# Patient Record
Sex: MALE | Race: WHITE
[De-identification: names, ages, dates, MRNs, and addresses within clinical notes are randomized per-mention and may not be internally consistent; named-entity substitution may affect disease eponyms.]

---

## 2017-08-10 ENCOUNTER — HOSPITAL ENCOUNTER (OUTPATIENT)
Dept: HOSPITAL 76 - ED | Age: 72
Setting detail: OBSERVATION
LOS: 1 days | Discharge: HOME | End: 2017-08-11
Attending: NURSE PRACTITIONER | Admitting: NURSE PRACTITIONER
Payer: OTHER GOVERNMENT

## 2017-08-10 DIAGNOSIS — G45.9: Primary | ICD-10-CM

## 2017-08-10 DIAGNOSIS — R20.0: ICD-10-CM

## 2017-08-10 DIAGNOSIS — I25.2: ICD-10-CM

## 2017-08-10 DIAGNOSIS — I69.398: ICD-10-CM

## 2017-08-10 DIAGNOSIS — Z79.84: ICD-10-CM

## 2017-08-10 DIAGNOSIS — E66.01: ICD-10-CM

## 2017-08-10 DIAGNOSIS — G62.89: ICD-10-CM

## 2017-08-10 DIAGNOSIS — I25.10: ICD-10-CM

## 2017-08-10 DIAGNOSIS — Z87.891: ICD-10-CM

## 2017-08-10 DIAGNOSIS — Z79.82: ICD-10-CM

## 2017-08-10 DIAGNOSIS — E11.9: ICD-10-CM

## 2017-08-10 DIAGNOSIS — Z95.1: ICD-10-CM

## 2017-08-10 DIAGNOSIS — Z95.5: ICD-10-CM

## 2017-08-10 DIAGNOSIS — E78.5: ICD-10-CM

## 2017-08-10 DIAGNOSIS — I10: ICD-10-CM

## 2017-08-10 LAB
ALBUMIN/GLOB SERPL: 1.3 {RATIO} (ref 1–2.2)
ANION GAP SERPL CALCULATED.4IONS-SCNC: 7 MMOL/L (ref 6–13)
BASOPHILS NFR BLD AUTO: 0.1 10^3/UL (ref 0–0.1)
BASOPHILS NFR BLD AUTO: 1.4 %
BILIRUB BLD-MCNC: 0.7 MG/DL (ref 0.2–1)
BUN SERPL-MCNC: 16 MG/DL (ref 6–20)
CALCIUM UR-MCNC: 9.4 MG/DL (ref 8.5–10.3)
CHLORIDE SERPL-SCNC: 103 MMOL/L (ref 101–111)
CO2 SERPL-SCNC: 28 MMOL/L (ref 21–32)
CREAT SERPLBLD-SCNC: 1 MG/DL (ref 0.6–1.2)
CUL URINE ADD CHARGE: (no result)
EOSINOPHIL # BLD AUTO: 0.2 10^3/UL (ref 0–0.7)
EOSINOPHIL NFR BLD AUTO: 2 %
ERYTHROCYTE [DISTWIDTH] IN BLOOD BY AUTOMATED COUNT: 12.9 % (ref 12–15)
EST. AVERAGE GLUCOSE BLD GHB EST-MCNC: 197 MG/DL (ref 70–100)
GFRSERPLBLD MDRD-ARVRAT: 74 ML/MIN/{1.73_M2} (ref 89–?)
GLOBULIN SER-MCNC: 3.2 G/DL (ref 2.1–4.2)
GLUCOSE SERPL-MCNC: 128 MG/DL (ref 70–100)
HBA1C BLD-MCNC: 1.2 G/DL
HCT VFR BLD AUTO: 44.5 % (ref 42–52)
HGB UR QL STRIP: 15.5 G/DL (ref 14–18)
INR PPP: 1 (ref 0.8–1.2)
LIPASE SERPL-CCNC: 17 U/L (ref 22–51)
LYMPHOCYTES # SPEC AUTO: 1.4 10^3/UL (ref 1.5–3.5)
LYMPHOCYTES NFR BLD AUTO: 17.5 %
MCH RBC QN AUTO: 30.8 PG (ref 27–31)
MCHC RBC AUTO-ENTMCNC: 34.9 G/DL (ref 32–36)
MCV RBC AUTO: 88.3 FL (ref 80–94)
MONOCYTES # BLD AUTO: 0.6 10^3/UL (ref 0–1)
MONOCYTES NFR BLD AUTO: 7.7 %
NEUTROPHILS # BLD AUTO: 5.9 10^3/UL (ref 1.5–6.6)
NEUTROPHILS # SNV AUTO: 8.2 X10^3/UL (ref 4.8–10.8)
NEUTROPHILS NFR BLD AUTO: 71.4 %
NRBC # BLD AUTO: 0.1 /100WBC
PDW BLD AUTO: 8.7 FL (ref 7.4–11.4)
PH UR STRIP.AUTO: 6 PH (ref 5–7.5)
POTASSIUM SERPL-SCNC: 4 MMOL/L (ref 3.5–5)
PROT SPEC-MCNC: 7.5 G/DL (ref 6.7–8.2)
PROTHROM ACT/NOR PPP: 11.1 SECS (ref 9.9–12.6)
RBC MAR: 5.04 10^6/UL (ref 4.7–6.1)
SODIUM SERPLBLD-SCNC: 138 MMOL/L (ref 135–145)
SP GR UR STRIP.AUTO: 1.02 (ref 1–1.03)
UA CHARGE (STRIP ONLY): (no result)
UA W/ MICROSCOPIC CHARGE: YES
UR CULTURE IF IND: (no result)
UROBILINOGEN UR STRIP.AUTO-MCNC: NEGATIVE MG/DL
WBC # BLD: 8.2 X10^3/UL
WBC # UR MANUAL: (no result) /HPF (ref 0–3)

## 2017-08-10 PROCEDURE — 83036 HEMOGLOBIN GLYCOSYLATED A1C: CPT

## 2017-08-10 PROCEDURE — 99285 EMERGENCY DEPT VISIT HI MDM: CPT

## 2017-08-10 PROCEDURE — 84484 ASSAY OF TROPONIN QUANT: CPT

## 2017-08-10 PROCEDURE — 85610 PROTHROMBIN TIME: CPT

## 2017-08-10 PROCEDURE — 99284 EMERGENCY DEPT VISIT MOD MDM: CPT

## 2017-08-10 PROCEDURE — 70496 CT ANGIOGRAPHY HEAD: CPT

## 2017-08-10 PROCEDURE — 85025 COMPLETE CBC W/AUTO DIFF WBC: CPT

## 2017-08-10 PROCEDURE — 93005 ELECTROCARDIOGRAM TRACING: CPT

## 2017-08-10 PROCEDURE — 96360 HYDRATION IV INFUSION INIT: CPT

## 2017-08-10 PROCEDURE — 80053 COMPREHEN METABOLIC PANEL: CPT

## 2017-08-10 PROCEDURE — 70498 CT ANGIOGRAPHY NECK: CPT

## 2017-08-10 PROCEDURE — 80320 DRUG SCREEN QUANTALCOHOLS: CPT

## 2017-08-10 PROCEDURE — 96372 THER/PROPH/DIAG INJ SC/IM: CPT

## 2017-08-10 PROCEDURE — 70450 CT HEAD/BRAIN W/O DYE: CPT

## 2017-08-10 PROCEDURE — 81001 URINALYSIS AUTO W/SCOPE: CPT

## 2017-08-10 PROCEDURE — 87086 URINE CULTURE/COLONY COUNT: CPT

## 2017-08-10 PROCEDURE — 81003 URINALYSIS AUTO W/O SCOPE: CPT

## 2017-08-10 PROCEDURE — 93306 TTE W/DOPPLER COMPLETE: CPT

## 2017-08-10 PROCEDURE — 36415 COLL VENOUS BLD VENIPUNCTURE: CPT

## 2017-08-10 PROCEDURE — 85730 THROMBOPLASTIN TIME PARTIAL: CPT

## 2017-08-10 PROCEDURE — 83690 ASSAY OF LIPASE: CPT

## 2017-08-10 PROCEDURE — 80061 LIPID PANEL: CPT

## 2017-08-10 RX ADMIN — INSULIN ASPART SCH UNIT: 100 INJECTION, SOLUTION INTRAVENOUS; SUBCUTANEOUS at 20:48

## 2017-08-10 RX ADMIN — SODIUM CHLORIDE, PRESERVATIVE FREE SCH ML: 5 INJECTION INTRAVENOUS at 20:55

## 2017-08-10 RX ADMIN — INSULIN ASPART SCH: 100 INJECTION, SOLUTION INTRAVENOUS; SUBCUTANEOUS at 19:12

## 2017-08-10 NOTE — CT REPORT
EXAM:

CT ANGIOGRAM HEAD

 

EXAM DATE: 8/10/2017 05:52 PM.

 

CLINICAL HISTORY: Heaviness, speech impairment beginning at 1200 hrs. Today

 

COMPARISON: CT head without contrast the same day.

 

TECHNIQUE: Routine helical CTA imaging was performed through the head. IV Contrast: 80 cc Isovue 300.
 Reconstructions: Routine multiplanar 3D MIP reconstructions. NASCET Criteria are used for stenosis m
easurements.

 

In accordance with CT protocol optimization, one or more of the following dose reduction techniques w
ere utilized for this exam: automated exposure control, adjustment of mA and/or KV based on patient s
ize, or use of iterative reconstructive technique.

 

FINDINGS:

Non Contrast Head:

There is no mass, mass effect, midline shift or abnormal extraaxial fluid collection.  Size and confi
guration of the ventricles appear normal.  There is no intracranial hemorrhage.

 

Gray white matter differentiation is maintained.  Brain stem and cerebellum appear unremarkable.

 

Calvarium and skull base appear intact and normal.

 

Orbits and extracranial soft tissue appear unremarkable.

 

Post contrast CT Head:

No abnormal enhancement.  Gray white matter differentiation appear preserved.

 

Dural venous sinus and deep cerebral veins appear normal.

 

CTA HEAD:

 

Anterior Circulation: There is diffuse atherosclerotic calcification of the cavernous carotid arterie
s bilaterally without significant stenosis. There is focal segmental less than 50% stenosis of the in
ferior M2 segment of the left MCA (image 52 series 14) with distal reconstitution. The internal carot
id arteries (ICA), middle cerebral arteries (MCA), and anterior cerebral arteries (BRIDGET) are patent bi
laterally. The anterior communicating artery (A-COM) appears patent.  No aneurysms, stenoses, or bradly
omic anomalies evident.

 

Posterior Circulation: The superior vertebral artery, basilar, and proximal posterior cerebral arteri
es (PCA) are patent. No perinephric aneurysm. There is moderate stenosis of the distal P3 segment of 
the bilateral PCAs. The posterior communicating arteries (P-COM) are hypoplastic bilaterally.

 

CTA NECK:

 

Right Carotid: The common carotid, internal carotid, and external carotid arteries are patent. Mild a
therosclerotic plaque carotid bifurcation without significant stenosis. No dissection, significant at
herosclerotic plaque, or calcification identified. No significant stenosis by NASCET criteria. 

 

Left Carotid: The common carotid, internal carotid, and external carotid arteries are widely patent. 
Calcified and noncalcified plaque in the left carotid bifurcation without significant stenosis. No di
ssection, significant atherosclerotic plaque, or calcification identified. No significant stenosis by
 NASCET criteria.

 

Vertebrals: The vertebrobasilar system shows no stenosis, dissection, aneurysm, or significant athero
sclerotic disease.

 

Visualized aortic arch and pulmonary artery appear normal.

 

Other: Postsurgical changes of coronary artery bypass and sternotomy. Visualized lungs are clear. 

 

IMPRESSION: 

1. No intracranial hemorrhage, midline shift or hydrocephalus.

2. Normal dural venous sinus enhancement. No abnormal enhancement.

3. Mild, less than 50% stenosis of the distal inferior M2 segment of the left MCA with distal reconst
itution.

4. Moderate appearing probable atherosclerotic stenosis of the distal P3 segments of bilateral PCAs.

5. No significant cervical carotid or vertebral artery stenosis. No evidence to suggest dissection.

 

[facility]

Referring Provider Line: [phone]

 

SITE ID: [id number]

## 2017-08-10 NOTE — HISTORY & PHYSICAL EXAMINATION
DATE OF ADMISSION: 08/10/2017

 

PRIMARY CARE PHYSICIAN: Dr. Jennifer Soni at [facility] 

 

HISTORY OF PRESENT ILLNESS: This is a 72-year-old gentleman placed in 
observation with a chief complaint of sudden onset unable to use both arms with 
slurred speech today, also both feet were heavy

 

Information was obtained from the patient, family and old record. 

 

PROBLEM LIST:

1. Stroke-like symptoms, TIA versus stroke.

2. Obesity.

3. Diabetes type 2.

4. History of coronary artery disease status post MI.

5. History of CABG in 1999.

6. History of cardiac stent x1 in 2000.

7. History of stroke.

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

9. Hypertension.

10. Hyperlipidemia.

 

CODE STATUS: FULL CODE. 

 

HISTORY OF PRESENT ILLNESS: This 72-year-old gentleman with history of obesity, 
diabetes type 2, coronary artery disease, hypertension, hyperlipidemia, 
neuropathy was in his usual state of health. Today around noon time he noticed 
he was unable to use both arms very well with numbness, right side is worse 
than the left. He also felt both feet are week and heavy. He had trouble to get 
his feet into the car. He also had a hard time to talk, mainly having hard time 
to pronounce words. The symptoms lasted about 45 minutes. His wife was 
concerned so they drove to the hospital. Once he walked into the hospital, his 
symptoms were gone. He had associated symptoms of lightheadedness. He had a 
similar experience 4-5 months ago, but it only lasted about 20 minutes.

 

In the emergency room, the patient had a negative CAT scan of head. He received 
aspirin and Plavix. Due to stroke-like symptoms he is placed in observation.

 

When I saw the patient in the emergency room, he has no complaint of fever, 
chills, shortness of breath, chest pain, nausea, lightheadedness, he thinks he 
is back to normal. He told me that his wife did not notice he had any facial 
drooping. He has no trouble with swallowing and speech, no vision change at 
this time. He has no weakness on his arms and legs.

 

Treatment plan was reviewed with him and his wife. CODE STATUS was addressed. 
he is a FULL CODE.

 

REVIEW OF SYSTEMS: Besides mentioned above, negative findings in the rest of 
the review of systems.

 

PAST MEDICAL AND SURGICAL HISTORY: The patient had spleen surgery.

 

ALLERGIES: NO KNOWN DRUG ALLERGIES.

 

MEDICATIONS: The patient unable to give me the full least of medications. He 
remembers that he takes:

1. Metformin 1000 mg twice a day.

2. Gabapentin 600 mg twice a day.

3. Aspirin 81 mg daily.

4. Glipizide 10 mg daily.

 

[facility].

 

SOCIAL HISTORY: The patient quit smoking 34 years ago. He quit drinking 35 
years ago. He does not use recreational drugs. He lives with his wife locally.

 

FAMILY HISTORY: His mother had heart disease.

 

PHYSICAL EXAMINATION:

CONSTITUTIONAL: He in no acute distress.

VITAL SIGNS: Temperature 36.3, heart rate 73. Blood pressure 145/73. 
Respirations rate 20. Oxygen saturation 94% on room air. HEENT: Head 
atraumatic. Facial features symmetrical at rest and with movement. PERRLA, 
EOMI. Normal conjunctivae. No jaundice noticed. Oropharynx is free of mucositis 
or thrush.

NECK: Supple. Thyroid impalpable. No lymphadenopathy. No carotid bruits.

RESPIRATORY: No respiratory distress. Lungs sounds are clear.

CARDIOVASCULAR: Regular heart rate and rhythm without murmur.

GASTROINTESTINAL: Abdomen soft. Bowel tones present. There is no rebound or 
guarding.

GENITOURINARY: No CVA tenderness.

MUSCULOSKELETAL: Muscle tone 5+ equally. DTR intact

SKIN: Warm and dry.

NEUROLOGIC: Alert and oriented x3. Speech clear. No swallow deficit noticed. 
Cranial nerves intact without new focal deficits.

PSYCHIATRIC: Calm and pleasant.

 

LABORATORY: BMP shows sodium level 138, potassium 4, chloride 103, serum 
bicarbonate 28, anion gap 7. BUN 16. Creatinine 1.0, glucose 128, lipase 17. 
Liver function unremarkable.

 

CBC showed white blood cell count 8.2, hemoglobin 15.5, hematocrit 44.5, 
platelets 196.

 

INR level 1.0.

 

Alcohol level less than 5. 

 

CT of head today shows normal head CT.

 

ASSESSMENT AND PLAN:

1. Stroke-like symptoms, transient ischemic attack versus stroke.

The patient's symptoms have resolved. Clinically, he does not need PT, OT and 
speech evaluation. 

He will get neuro checks regularly. He will be on telemetry. 

We will have CTA of the head and neck done as well as MRI of the brain. 

He already received Plavix in the emergency room. I will start the Plavix 
tomorrow. He failed aspirin treatment.  Aspirin will be discontinued to prevent 
potential side effects of bleeding with dual antiplatelets. 

We will also check his lipid panel. He will receive Lipitor 80 mg daily. 

He will also get echocardiogram. He is on telemetry so we can rule out any 
arrhythmia.



2. Diabetes type 2. His glipizide and metformin will be on hold. He should not 
have metformin for 48 hours after the contrast use. He will be on sliding scale 
insulin per protocol.



3. Hypertension. We do not know his antihypertensive medications at this point. 
We will resume his medications as indicated.



4. Neuropathy. We will resume his gabapentin.



5. DVT prophylaxis with Lovenox.

 



DISPOSITION: The patient is placed in observation, he might be able to go home 
tomorrow if negative findings.

 

 

 

DD:08/10/2017 17:06:00  DT: 08/10/2017 17:48  JOB #: [id number]  EXT JOB #:[id number]

YARELIS

## 2017-08-10 NOTE — ED PHYSICIAN DOCUMENTATION
History of Present Illness





- Stated complaint


Stated Complaint: RT ARM WEAKNESS,SLURRED SPEECH





- Chief complaint


Chief Complaint: Neuro





- History obtained from


History obtained from: Patient





- Additonal information


Additional information: 


This patient is a pleasant 72-year-old man who was primarily to [facility].  He 

has a history of hypertension, diabetes, dyslipidemia and coronary disease 

status post bypass and stent in the past.  He also says about 5 years ago he 

had a CVA.  He still has mild residual numbness and tingling in the right 

fourth and fifth distal phalanx otherwise has no disability.  He is day started 

out normally for the most part.  About noon he is driving his wife to the 

hospital for elective outpatient mammography.  At that time he noticed that his 

right foot felt weak and funny while pressing on the accelerator.  The patient 

then stomped on the brakes and was able to get out of the car and have his wife 

drive.  At that time he noticed his right arm seemed a little weak in his right 

leg was also weak.  He also question maybe there is a little weakness in the 

left but is quite sure the right was affected.  He also felt that he had some 

difficulty speaking at that time.  His symptoms lasted about 20-25 minutes and 

then completely dissipated by the time he reached the hospital here.  At 

present he has no complaints whatsoever.  He has no visual complaints.  His 

wife confirms that his speech is completely normal.  He denies any headache and 

has no chest pain or shortness of breath.





Review of systems:





For pertinent positive and negatives in the review of systems please see the 

history of present illness, otherwise all other systems have been reviewed and 

are negative.





Dragon disclaimer:





Parts of this medical record were created using voice recognition technology.  

Because of the inherent limitations of this system, occasional same sounding 

word substitutions do occur and persist despite proofreading.  Please read the 

document for context.











Review of Systems


Constitutional: denies: Fever, Chills


Eyes: denies: Loss of vision, Decreased vision, Photophobia, Discharge


Ears: denies: Loss of hearing


Cardiac: denies: Chest pain / pressure, Palpitations, Pedal edema


Respiratory: denies: Dyspnea, Cough


GI: denies: Abdominal Pain, Abdominal Swelling, Nausea, Vomiting, Constipation, 

Diarrhea, Hematemesis


: denies: Dysuria, Hesitancy, Unable to Void


Skin: denies: Rash, Lesions, Abrasion (s)


Musculoskeletal: denies: Neck pain, Back pain


Neurologic: denies: Generalized weakness, Focal weakness, Numbness, Difficulty 

speaking, Altered mental status, Unresponsive, LOC





PD PAST MEDICAL HISTORY





- Past Medical History


Cardiovascular: Other


Neuro: CVA


Endocrine/Autoimmune: Type 2 diabetes


Other Past Medical History: MI





- Past Surgical History


Past Surgical History: Yes


Cardiovascular: CABG





- Present Medications


Home Medications: 


 Ambulatory Orders











 Medication  Instructions  Recorded  Confirmed


 


metFORMIN [Glucophage] 2,000 mg PO DAILY 10/15/16 08/10/17


 


Glipizide 5 mg PO DAILY 08/10/17 08/10/17














- Allergies


Allergies/Adverse Reactions: 


 Allergies











Allergy/AdvReac Type Severity Reaction Status Date / Time


 


No Known Drug Allergies Allergy   Verified 08/10/17 13:02














- Social History


Does the pt smoke?: No


Smoking Status: Never smoker


Does the pt drink ETOH?: No


Does the pt have substance abuse?: No





- Immunizations


Immunizations are current?: No


Immunizations: TDAP >10years/unknown





- POLST


Patient has POLST: No





PD ED PE NORMAL





- General


General: Alert and oriented X 3, No acute distress, Well developed/nourished, 

Other (Large habitus gregarious  male no apparent distress)





- Neck


Neck: Supple, no meningeal sign





- Cardiac


Cardiac: RRR, No murmur, No gallop





- Respiratory


Respiratory: No respiratory distress, Clear bilaterally





- Abdomen


Abdomen: Normal bowel sounds, Non tender, Non distended





- Male 


Male : Deferred, Pt declined





- Rectal


Rectal: Deferred, Pt declined





- Back


Back: No CVA TTP, No spinal TTP





- Derm


Derm: Normal color





- Extremities


Extremities: No deformity, No tenderness to palpate, Normal ROM s pain, No edema





- Neuro


Neuro: Alert and oriented X 3, CNs 2-12 intact, No motor deficit, No sensory 

deficit





- Psych


Psych: Normal mood, Normal affect





Results





- Vitals


Vitals: 





 Vital Signs - 24 hr











  08/10/17 08/10/17 08/10/17





  12:58 14:38 16:14


 


Temperature 36.3 C L  


 


Heart Rate 73 73 75


 


Respiratory 16 20 16





Rate   


 


Blood Pressure 159/87 H 145/73 H 142/78 H


 


O2 Saturation 94 94 95








 Oxygen











O2 Source                      Room air

















- Labs


Labs: 





 Laboratory Tests











  08/10/17 08/10/17 08/10/17





  13:25 13:25 13:25


 


WBC  8.2  


 


RBC  5.04  


 


Hgb  15.5  


 


Hct  44.5  


 


MCV  88.3  


 


MCH  30.8  


 


MCHC  34.9  


 


RDW  12.9  


 


Plt Count  196  


 


MPV  8.7  


 


Neut #  5.9  


 


Lymph #  1.4 L  


 


Mono #  0.6  


 


Eos #  0.2  


 


Baso #  0.1  


 


Absolute Nucleated RBC  0.00  


 


Nucleated RBCs  0.1  


 


PT   11.1 


 


INR   1.0 


 


Sodium    138


 


Potassium    4.0


 


Chloride    103


 


Carbon Dioxide    28


 


Anion Gap    7.0


 


BUN    16


 


Creatinine    1.0


 


Estimated GFR (MDRD)    74 L


 


Glucose    128 H


 


Calcium    9.4


 


Total Bilirubin    0.7


 


AST    20


 


ALT    24


 


Alkaline Phosphatase    99


 


Total Protein    7.5


 


Albumin    4.3


 


Globulin    3.2


 


Albumin/Globulin Ratio    1.3


 


Lipase    17 L


 


Urine Color   


 


Urine Clarity   


 


Urine pH   


 


Ur Specific Gravity   


 


Urine Protein   


 


Urine Glucose (UA)   


 


Urine Ketones   


 


Urine Occult Blood   


 


Urine Nitrite   


 


Urine Bilirubin   


 


Urine Urobilinogen   


 


Ur Leukocyte Esterase   


 


Urine RBC   


 


Urine WBC   


 


Ur Squamous Epith Cells   


 


Urine Bacteria   


 


Ur Microscopic Review   


 


Urine Culture Comments   


 


Ethyl Alcohol    < 5.0














  08/10/17





  15:18


 


WBC 


 


RBC 


 


Hgb 


 


Hct 


 


MCV 


 


MCH 


 


MCHC 


 


RDW 


 


Plt Count 


 


MPV 


 


Neut # 


 


Lymph # 


 


Mono # 


 


Eos # 


 


Baso # 


 


Absolute Nucleated RBC 


 


Nucleated RBCs 


 


PT 


 


INR 


 


Sodium 


 


Potassium 


 


Chloride 


 


Carbon Dioxide 


 


Anion Gap 


 


BUN 


 


Creatinine 


 


Estimated GFR (MDRD) 


 


Glucose 


 


Calcium 


 


Total Bilirubin 


 


AST 


 


ALT 


 


Alkaline Phosphatase 


 


Total Protein 


 


Albumin 


 


Globulin 


 


Albumin/Globulin Ratio 


 


Lipase 


 


Urine Color  YELLOW


 


Urine Clarity  CLEAR


 


Urine pH  6.0


 


Ur Specific Gravity  1.020


 


Urine Protein  NEGATIVE


 


Urine Glucose (UA)  NEGATIVE


 


Urine Ketones  NEGATIVE


 


Urine Occult Blood  NEGATIVE


 


Urine Nitrite  NEGATIVE


 


Urine Bilirubin  NEGATIVE


 


Urine Urobilinogen  0.2 (NORMAL)


 


Ur Leukocyte Esterase  TRACE H


 


Urine RBC  None Seen


 


Urine WBC  0-3


 


Ur Squamous Epith Cells  FEW Squamous


 


Urine Bacteria  None Seen


 


Ur Microscopic Review  INDICATED


 


Urine Culture Comments  INDICATED


 


Ethyl Alcohol 














PD MEDICAL DECISION MAKING





- ED course


Complexity details: reviewed old records, reviewed results, re-evaluated patient

, d/w family, d/w consultant


ED course: 


Patient is a pleasant 72-year-old man who presents with 130 minutes of focal 

neurologic complaints involving the right side of the body.  These complaints 

involve right upper extremity right lower extremity weakness clumsiness, and 

transient difficulty speaking.  The symptoms are completely resolved.  His 

physical exam is completely normal NIH stroke scale is 1.  CT scan of the head 

is normal without any evidence of any any cranial abnormality.  EKG shows 

normal sinus rhythm.  The heart rate is 104 bpm the NV QRS and QT intervals are 

normal there is no ST segment elevation depression or T-wave inversion there is 

a suggestion of a left anterior fascicular block.  Blood work on this patient 

is unremarkable.  [facility] 

[facility] the patient here at her hospital.  I believe this patient has a TIA and he 

is high risk for recurrent TIA and/or stroke in a short period of time.  The 

patient's ABCD2 score is 6 points with puts him at very high risk.  This patient

's 7 day stroke risk is calculated at 11.7%.





Disposition: Admission.





Clinical impression:


1.  Acute transient ischemic attack with right sided transient hemiparesis and 

aphasia








Departure





- Departure


Disposition: [facility] DC/Xfer


Clinical Impression: 


Transient ischemic attack (TIA)


Qualifiers:


 Transient cerebral ischemia type: unspecified Qualified Code(s): G45.9 - 

Transient cerebral ischemic attack, unspecified

## 2017-08-10 NOTE — CT PRELIMINARY REPORT
Accession: [id number]

Exam: CT Head W/O

 

IMPRESSION: Normal head CT.

 

[facility]

 

SITE ID: [id number]

## 2017-08-10 NOTE — CT REPORT
EXAM:

CT ANGIOGRAM HEAD

 

EXAM DATE: 8/10/2017 05:52 PM.

 

CLINICAL HISTORY: Heaviness, speech impairment beginning at 1200 hrs. Today

 

COMPARISON: CT head without contrast the same day.

 

TECHNIQUE: Routine helical CTA imaging was performed through the head. IV Contrast: 80 cc Isovue 300.
 Reconstructions: Routine multiplanar 3D MIP reconstructions. NASCET Criteria are used for stenosis m
easurements.

 

In accordance with CT protocol optimization, one or more of the following dose reduction techniques w
ere utilized for this exam: automated exposure control, adjustment of mA and/or KV based on patient s
ize, or use of iterative reconstructive technique.

 

FINDINGS:

Non Contrast Head:

There is no mass, mass effect, midline shift or abnormal extraaxial fluid collection.  Size and confi
guration of the ventricles appear normal.  There is no intracranial hemorrhage.

 

Gray white matter differentiation is maintained.  Brain stem and cerebellum appear unremarkable.

 

Calvarium and skull base appear intact and normal.

 

Orbits and extracranial soft tissue appear unremarkable.

 

Post contrast CT Head:

No abnormal enhancement.  Gray white matter differentiation appear preserved.

 

Dural venous sinus and deep cerebral veins appear normal.

 

CTA HEAD:

 

Anterior Circulation: There is diffuse atherosclerotic calcification of the cavernous carotid arterie
s bilaterally without significant stenosis. There is focal segmental less than 50% stenosis of the in
ferior M2 segment of the left MCA (image 52 series 14) with distal reconstitution. The internal carot
id arteries (ICA), middle cerebral arteries (MCA), and anterior cerebral arteries (BRIDGET) are patent bi
laterally. The anterior communicating artery (A-COM) appears patent.  No aneurysms, stenoses, or bradly
omic anomalies evident.

 

Posterior Circulation: The superior vertebral artery, basilar, and proximal posterior cerebral arteri
es (PCA) are patent. No perinephric aneurysm. There is moderate stenosis of the distal P3 segment of 
the bilateral PCAs. The posterior communicating arteries (P-COM) are hypoplastic bilaterally.

 

CTA NECK:

 

Right Carotid: The common carotid, internal carotid, and external carotid arteries are patent. Mild a
therosclerotic plaque carotid bifurcation without significant stenosis. No dissection, significant at
herosclerotic plaque, or calcification identified. No significant stenosis by NASCET criteria. 

 

Left Carotid: The common carotid, internal carotid, and external carotid arteries are widely patent. 
Calcified and noncalcified plaque in the left carotid bifurcation without significant stenosis. No di
ssection, significant atherosclerotic plaque, or calcification identified. No significant stenosis by
 NASCET criteria.

 

Vertebrals: The vertebrobasilar system shows no stenosis, dissection, aneurysm, or significant athero
sclerotic disease.

 

Visualized aortic arch and pulmonary artery appear normal.

 

Other: Postsurgical changes of coronary artery bypass and sternotomy. Visualized lungs are clear. 

 

IMPRESSION: 

1. No intracranial hemorrhage, midline shift or hydrocephalus.

2. Normal dural venous sinus enhancement. No abnormal enhancement.

3. Mild, less than 50% stenosis of the distal inferior M2 segment of the left MCA with distal reconst
itution.

4. Moderate appearing probable atherosclerotic stenosis of the distal P3 segments of bilateral PCAs.

5. No significant cervical carotid or vertebral artery stenosis. No evidence to suggest dissection.

 

[facility]

Referring Provider Line: [phone]

 

SITE ID: [id number]

## 2017-08-10 NOTE — CT PRELIMINARY REPORT
Accession: [id number]

Exam: CT Neck Angio

 

IMPRESSION: 

1. No intracranial hemorrhage, midline shift or hydrocephalus.

2. Normal dural venous sinus enhancement. No abnormal enhancement.

3. Mild, less than 50% stenosis of the distal inferior M2 segment of the left MCA with distal reconst
itution.

4. Moderate appearing probable atherosclerotic stenosis of the distal P3 segments of bilateral PCAs.

5. No significant cervical carotid or vertebral artery stenosis. No evidence to suggest dissection.

 

[facility]

 

SITE ID: [id number]

## 2017-08-10 NOTE — CT REPORT
EXAM:

CT HEAD

 

EXAM DATE: 8/10/2017 01:41 p.m.

 

CLINICAL HISTORY: Heaviness, speech impairment beginning at 1200 hrs. today.

 

COMPARISON: None.

 

TECHNIQUE: Multiaxial CT images were obtained from the foramen magnum to the vertex. IV contrast: Non
e. Reformats: Coronal.

 

In accordance with CT protocol optimization, one or more of the following dose reduction techniques w
ere utilized for this exam: automated exposure control, adjustment of mA and/or KV based on patient s
ize, or use of iterative reconstructive technique.

 

FINDINGS:

Parenchyma: No intraparenchymal hemorrhage. No evidence of mass, midline shift, or CT findings of inf
arction. Gray-white differentiation is distinct.

 

Extraaxial Spaces: Normal for age. No subdural or epidural collections identified.

 

Ventricles: Normal in size and position.

 

Sinuses: Imaged paranasal sinuses, orbits, and mastoids show no significant abnormality.

 

Bones: No evidence of fracture or calvarial defect.

 

Other: None.

 

IMPRESSION: 

Normal head CT.

 

[facility]

Referring Provider Line: [phone]

 

SITE ID: [id number]

## 2017-08-10 NOTE — CT PRELIMINARY REPORT
Accession: [id number]

Exam: CT Head Angio

 

IMPRESSION: 

1. No intracranial hemorrhage, midline shift or hydrocephalus.

2. Normal dural venous sinus enhancement. No abnormal enhancement.

3. Mild, less than 50% stenosis of the distal inferior M2 segment of the left MCA with distal reconst
itution.

4. Moderate appearing probable atherosclerotic stenosis of the distal P3 segments of bilateral PCAs.

5. No significant cervical carotid or vertebral artery stenosis. No evidence to suggest dissection.

 

[facility]

 

SITE ID: [id number]

## 2017-08-11 VITALS — SYSTOLIC BLOOD PRESSURE: 129 MMHG | DIASTOLIC BLOOD PRESSURE: 89 MMHG

## 2017-08-11 LAB
BASOPHILS NFR BLD AUTO: 0.1 10^3/UL (ref 0–0.1)
BASOPHILS NFR BLD AUTO: 1 %
CHOLEST SERPL-MCNC: 148 MG/DL
EOSINOPHIL # BLD AUTO: 0.2 10^3/UL (ref 0–0.7)
EOSINOPHIL NFR BLD AUTO: 2 %
ERYTHROCYTE [DISTWIDTH] IN BLOOD BY AUTOMATED COUNT: 12.9 % (ref 12–15)
HCT VFR BLD AUTO: 47.4 % (ref 42–52)
HDLC SERPL-MCNC: 37 MG/DL
HDLC SERPL: 4 {RATIO} (ref ?–5)
HGB UR QL STRIP: 15.9 G/DL (ref 14–18)
LDLC/HDLC SERPL: 1.9 {RATIO} (ref ?–3.6)
LYMPHOCYTES # SPEC AUTO: 1.6 10^3/UL (ref 1.5–3.5)
LYMPHOCYTES NFR BLD AUTO: 19.1 %
MCH RBC QN AUTO: 29.9 PG (ref 27–31)
MCHC RBC AUTO-ENTMCNC: 33.6 G/DL (ref 32–36)
MCV RBC AUTO: 89 FL (ref 80–94)
MONOCYTES # BLD AUTO: 0.6 10^3/UL (ref 0–1)
MONOCYTES NFR BLD AUTO: 7.3 %
NEUTROPHILS # BLD AUTO: 6 10^3/UL (ref 1.5–6.6)
NEUTROPHILS # SNV AUTO: 8.5 X10^3/UL (ref 4.8–10.8)
NEUTROPHILS NFR BLD AUTO: 70.6 %
NRBC # BLD AUTO: 0.1 /100WBC
PDW BLD AUTO: 8.5 FL (ref 7.4–11.4)
RBC MAR: 5.32 10^6/UL (ref 4.7–6.1)
TRIGL P FAST SERPL-MCNC: 204 MG/DL
VLDLC SERPL-SCNC: 41 MG/DL
WBC # BLD: 8.5 X10^3/UL

## 2017-08-11 RX ADMIN — SODIUM CHLORIDE, PRESERVATIVE FREE SCH ML: 5 INJECTION INTRAVENOUS at 05:59

## 2017-08-11 RX ADMIN — INSULIN ASPART SCH UNIT: 100 INJECTION, SOLUTION INTRAVENOUS; SUBCUTANEOUS at 12:09

## 2017-08-11 RX ADMIN — INSULIN ASPART SCH: 100 INJECTION, SOLUTION INTRAVENOUS; SUBCUTANEOUS at 07:53

## 2017-08-11 NOTE — DISCHARGE SUMMARY
DATE OF ADMISSION: 08/10/2017

 

DATE OF DISCHARGE: 08/11/2017

 

PRIMARY CARE PROVIDER: [facility]

 

DISCHARGE DIAGNOSES:

1. Stroke-like symptoms, transient ischemic attack, resolved. on Plavix now

2. Morbid obesity, body mass index 41.6.

3. Diabetes type 2. HgA1c 8.5.

4. History of coronary artery disease status post myocardial infarction, stable.

5. History of coronary artery bypass graft in 1999.

6. History of cardiac stent x1 in 2000.

7. History of stroke.

8. 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

9. Hypertension, stable on atenolol.

10. Hyperlipidemia, on statin.

 

DISPOSITION: The patient will be discharged back to home with stable condition.

 

HOME MEDICATIONS:

1. Atenolol 50 mg daily.

2. Gabapentin 400 mg twice a day.

3. Gabapentin 800 mg in the evening.

4. Glipizide 2.5 mg 3 times a day.

5. Metformin 1000 mg twice a day, hold until 08/13/2017 due to recent contrast 
use.

6. Vitamin C 500 mg daily.

7. Lipitor 80 mg daily. The dose was increased.

8. Plavix 75 mg daily. Replace baby aspirin due to recent TIA event.

Aspirin 81 mg is discontinued.

 

FOLLOWUP ISSUES:

1. The patient has recent stroke-like symptoms likely TIA. His aspirin is 
replaced by Plavix and Lipitor dose was increased to 80 mg. Please monitor 
closely.

2. The patient is unable to fit in MRI machine in the hospital. Please have MRI 
of the brain done as outpatient to complete workup

 

BRIEF HISTORY: This 72-year-old gentleman with history of obesity, diabetes 
type 2, coronary artery disease, hypertension, hyperlipidemia, neuropathy, came 
to the emergency room due to sudden onset stroke-like symptoms. At that time, 
he was unable to use both arms properly due to numbness, right side was worse 
than the left. He also felt both feet weak and heavy. He had trouble to 
pronounce words. Once he came to the hospital, his symptoms were resolved 
completely. Due to stroke-like symptoms, the patient was placed in observation. 
Please see detailed information on H and P done by Sharona Luis, the nurse 
practitioner, on 08/10/2017.

 

HOSPITAL COURSE: The patient was on telemetry. There was no significant 
arrhythmia noted. He underwent CTA of the head and neck, which showed no 
significant stenosis. He was unable to fit in our MRI machine due to his 
obesity. His MRI of the brain could be done as outpatient. His echocardiogram 
was also unremarkable. His aspirin was changed to Plavix due to TIA symptoms. 
He does have elevated cholesterol levels. His Lipitor dose was increased.

 

Since admission he has not had focal neuro deficit. He did not need PT, OT and 
speech evaluation. His vital signs have been stable. He slept well overnight. 
No fever, chills, nausea, vomiting, chest pain, shortness of breath. No 
weakness on his arms, neither the legs.

 

The patient was seen today.

CONSTITUTIONAL: In no acute distress.

VITAL SIGNS: Temperature 36.8, heart rate 78, blood pressure 129/89, 
respirations 20, oxygen saturation 93% on room air.

HEENT: Head atraumatic, PERRLA. Mouth mucosa moist, oropharynx clear. No facial 
droop noted.

RESPIRATORY: Lung sounds clear bilateral.

CARDIOVASCULAR: Regular heart rate and rhythm without murmur.

GI: Abdomen soft, bowel tones present.

MUSCULOSKELETAL: Muscle tones 5+ equally.

SKIN: Warm and dry.

NEUROLOGIC: Alert and oriented x3. Speech clear. No swallow deficit. Cranial 
nerves intact.

PSYCHIATRIC: Calm and pleasant.

 

LAB REVIEW: CBC today is unremarkable. Hemoglobin 15.9, hematocrit 47.4, 
platelet 193. WBC 8.5. APPT 28.7. INR 1.0. Triglycerides 204. Cholesterol 148, 
LDL 70. HDL 37. HgA1c 8.5.

 

Urinalysis is negative. Alcohol level less than 5.

 

IMAGING: 

CT of head on 08/10/2017 shows normal head CT. 

 

Echocardiogram on 08/11/2017 shows ejection fraction 45% to 50%. 



CTA of neck and head on 08/10/2017 shows no intracranial hemorrhage, midline 
shift, or hydrocephalus. Normal dural venous sinus enhancement. No abnormal 
enhancement. Mild less than 58% stenosis of distal, inferior M2 segment of the 
left MCA with distal reconstruction. Moderate appearing global stenosis of 
distal P3 segment of bilateral PCA. No significant cervical, carotid or 
vertebral artery stenosis, no evidence to suggest dissection.

 

The results were reviewed with the patient. Discharge plan was discussed with 
the patient. Questions and concerns were answered.

 

Spent 34 minutes on discharge.

 

 

 

DD:08/11/2017 10:31:00  DT: 08/11/2017 12:23  JOB #: [id number]  EXT JOB #:[id number]

[facility]

## 2017-08-11 NOTE — DISCHARGE PLAN
Discharge Plan


Disposition: 01 Home, Self Care


Condition: Good


Prescriptions: 


Atorvastatin [Lipitor] 80 mg PO QPM #30 tablet


Clopidogrel [Plavix] 75 mg PO DAILY #30 tablet


Diet: Cardiac (diabetic diet too)


Activity Restrictions: No Restrictions


Additional Instructions or Follow Up instructions: 


don't take Metformin today and tomorrow; start on 8/13 due to recent contrast 

use


See your PCP in a week; have MRI of brain done for recent stroke-like symptoms; 

also  monitor your condition





watch for bleeding with new medication Plavix.  if you have black stool, vomit 

blood, notify your doctor or come to ED; also notify surgeons if you need to 

have any procedures done.





monitor your blood pressure and blood sugar as instructed before





if you have fever, chills, nausea, vomit, abdominal pain, stroke-like symptoms, 

see your doctor ASAP or come to ED


activity as tolerated. 


No Smoking: If you smoke, Please STOP!  Call [phone] for help.

## 2022-01-24 ENCOUNTER — HOSPITAL ENCOUNTER (OUTPATIENT)
Dept: HOSPITAL 76 - DI.S | Age: 77
Discharge: HOME | End: 2022-01-24
Attending: NURSE PRACTITIONER
Payer: OTHER GOVERNMENT

## 2022-01-24 DIAGNOSIS — M25.521: Primary | ICD-10-CM

## 2022-01-24 DIAGNOSIS — M19.032: ICD-10-CM

## 2022-01-24 DIAGNOSIS — R22.31: ICD-10-CM

## 2022-01-24 DIAGNOSIS — R93.6: ICD-10-CM

## 2022-01-24 NOTE — XRAY REPORT
PROCEDURE:  Elbow 2 View RT

 

INDICATIONS:  RIGHT ELBOW PAIN

 

TECHNIQUE:  3 views of the elbow were acquired.  

 

COMPARISON:  None

 

FINDINGS:  

Bones:  No fractures or dislocations.  No suspicious bony lesions.  

 

Soft tissues:  No elbow joint effusion. Dorsal soft tissue swelling is seen. No suspicious soft tissu
e calcifications.  

 

IMPRESSION:  

Dorsal elbow soft tissue swelling which could represent olecranon bursitis. No elbow fracture or disl
ocation. No joint effusion. No bony erosive changes.

 

Reviewed by: Cuba Daigle MD on 1/24/2022 2:53 PM PST

Approved by: Cuba Daigle MD on 1/24/2022 2:53 PM PST

 

 

Station ID:  [id number]

## 2022-01-24 NOTE — XRAY REPORT
PROCEDURE:  Wrist 4 View LT

 

INDICATIONS: LEFT WRIST PAIN

 

TECHNIQUE:  4 views of the wrist were acquired.  

 

COMPARISON:  None

 

FINDINGS:  

 

Bones:  No fractures or dislocations. Mild to moderate osteoarthritic changes along radial aspect of 
left wrist are seen. Slight widening of scapholunate interval is also noted which may indicate scapho
lunate ligament injury. No suspicious bony lesions.  

 

Scaphoid view:  Scaphoid is grossly intact.

 

Soft tissues: Vascular calcifications are seen along radial aspect of left wrist.

 

IMPRESSION:  

 

1. No acute wrist fracture or dislocation. 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

2. Slight widening of scapholunate interval concerning for injury to scapholunate ligament.

 

Reviewed by: Cuba Daigle MD on 1/24/2022 2:54 PM PST

Approved by: Cuba Daigle MD on 1/24/2022 2:54 PM PST

 

 

Station ID:  [id number]

## 2023-10-15 ENCOUNTER — HOSPITAL ENCOUNTER (OUTPATIENT)
Dept: HOSPITAL 76 - EMS | Age: 78
End: 2023-10-15
Payer: OTHER GOVERNMENT

## 2023-10-15 DIAGNOSIS — R53.1: Primary | ICD-10-CM
